# Patient Record
Sex: MALE | Race: ASIAN | Employment: STUDENT | URBAN - METROPOLITAN AREA
[De-identification: names, ages, dates, MRNs, and addresses within clinical notes are randomized per-mention and may not be internally consistent; named-entity substitution may affect disease eponyms.]

---

## 2018-07-24 ENCOUNTER — TRANSFERRED RECORDS (OUTPATIENT)
Dept: HEALTH INFORMATION MANAGEMENT | Facility: CLINIC | Age: 28
End: 2018-07-24

## 2018-09-18 ENCOUNTER — TELEPHONE (OUTPATIENT)
Dept: PSYCHIATRY | Facility: CLINIC | Age: 28
End: 2018-09-18

## 2018-09-18 NOTE — TELEPHONE ENCOUNTER
PSYCHIATRY CLINIC PHONE INTAKE     SERVICES REQUESTED / INTERESTED IN          Med Management    Presenting Problem and Brief History                              What would you like to be seen for? (brief description):  Panic attacks, insomnia, general anxiety. Starting college, nervous about not doing well.   Have you received a mental health diagnosis? Yes   Which one (s): Anxiety  Is there any history of developmental delay?  No   Are you currently seeing a mental health provider?  No            Who / month last seen: Dr. Eladia Morin - [facility]  Do you have mental health records elsewhere?  Yes  Will you sign a release so we can obtain them?  Yes    Have you ever been hospitalized for psychiatric reasons?  No  Describe:      Do you have current thoughts of self-harm?  No    Do you currently have thoughts of harming others?  No       Substance Use History     Do you have any history of alcohol / illicit drug use?  No  Describe:    Have you ever received treatment for this?  No    Describe:       Social History     Does the patient have a guardian?  No      Have you had an ACT team in last 12 months?  No   Do you have any current or past legal issues?  No    OK to leave a detailed voicemail?  Yes    Medical/ Surgical History                                 There is no problem list on file for this patient.         Medications             No current outpatient prescriptions on file.     Citalopram 30mg  Lorazepam 10mg PRN    DISPOSITION      Phone screen completed. Patient will call back with insurance information and then can schedule with NP. -MS

## 2018-10-16 ENCOUNTER — OFFICE VISIT (OUTPATIENT)
Dept: PSYCHIATRY | Facility: CLINIC | Age: 28
End: 2018-10-16
Attending: NURSE PRACTITIONER
Payer: COMMERCIAL

## 2018-10-16 VITALS — SYSTOLIC BLOOD PRESSURE: 114 MMHG | HEART RATE: 73 BPM | DIASTOLIC BLOOD PRESSURE: 75 MMHG | WEIGHT: 181.2 LBS

## 2018-10-16 DIAGNOSIS — F41.9 ANXIETY: Primary | ICD-10-CM

## 2018-10-16 PROCEDURE — G0463 HOSPITAL OUTPT CLINIC VISIT: HCPCS | Mod: ZF

## 2018-10-16 RX ORDER — LORAZEPAM 0.5 MG/1
.5-1 TABLET ORAL PRN
Qty: 30 TABLET | Refills: 0 | Status: SHIPPED | OUTPATIENT
Start: 2018-10-16 | End: 2019-05-28

## 2018-10-16 RX ORDER — CITALOPRAM HYDROBROMIDE 40 MG/1
40 TABLET ORAL EVERY MORNING
Qty: 30 TABLET | Refills: 1 | Status: SHIPPED | OUTPATIENT
Start: 2018-10-16 | End: 2018-12-11

## 2018-10-16 RX ORDER — HYDROXYZINE PAMOATE 25 MG/1
CAPSULE ORAL
Qty: 60 CAPSULE | Refills: 1 | Status: SHIPPED | OUTPATIENT
Start: 2018-10-16 | End: 2019-05-28

## 2018-10-16 ASSESSMENT — PAIN SCALES - GENERAL: PAINLEVEL: NO PAIN (0)

## 2018-10-16 NOTE — MR AVS SNAPSHOT
After Visit Summary   10/16/2018    Rylee Owens    MRN: [id number]           Patient Information     Date Of Birth          1990        Visit Information        Provider Department      10/16/2018 1:00 PM Trevon Molina APRN CNP Psychiatry Clinic        Today's Diagnoses     Anxiety    -  1       Follow-ups after your visit        Your next 10 appointments already scheduled     2018 11:00 AM [facility]   Adult Med Follow UP with JOSE Sexton CNP   Psychiatry Clinic ([facility])    [place] [id number]  [address] [id number] [phone]              Who to contact     Please call your clinic at [phone] to:    Ask questions about your health    Make or cancel appointments    Discuss your medicines    Learn about your test results    Speak to your doctor            Additional Information About Your Visit        MyChart Information     SensorCatht is an electronic gateway that provides easy, online access to your medical records. With BluPanda, you can request a clinic appointment, read your test results, renew a prescription or communicate with your care team.     To sign up for SensorCatht visit the website at [email or web address]   You will be asked to enter the access code listed below, as well as some personal information. Please follow the directions to create your username and password.     Your access code is: [email or web address]  Expires: 2018 10:42 AM     Your access code will  in 90 days. If you need help or a new code, please contact your [facility] or call [phone] for assistance.        Care EveryWhere ID     This is your Care EveryWhere ID. This could be used by other organizations to access your [facility] medical records  [id number]        Your Vitals Were     Pulse                   73            Blood Pressure from Last 3 Encounters:   10/16/18 114/75    Weight from Last 3  Encounters:   10/16/18 82.2 kg (181 lb 3.2 oz)              Today, you had the following     No orders found for display         Today's Medication Changes          These changes are accurate as of 10/16/18 11:59 PM.  If you have any questions, ask your nurse or doctor.               Start taking these medicines.        Dose/Directions    hydrOXYzine 25 MG capsule   Commonly known as:  VISTARIL   Used for:  Anxiety   Started by:  Trevon Molina APRN CNP        Take 1-2 capsules (25-50mg) as needed for sleep   Quantity:  60 capsule   Refills:  1         These medicines have changed or have updated prescriptions.        Dose/Directions    citalopram 40 MG tablet   Commonly known as:  celeXA   This may have changed:    - medication strength  - how much to take  - when to take this   Used for:  Anxiety   Changed by:  Trevon Molina APRN CNP        Dose:  40 mg   Take 1 tablet (40 mg) by mouth every morning   Quantity:  30 tablet   Refills:  1       LORazepam 0.5 MG tablet   Commonly known as:  ATIVAN   This may have changed:  medication strength   Used for:  Anxiety   Changed by:  Trevon Molina APRN CNP        Dose:  0.5-1 mg   Take 1-2 tablets (0.5-1 mg) by mouth as needed for anxiety   Quantity:  30 tablet   Refills:  0            Where to get your medicines      These medications were sent to [place], [place] - [address] [place] [id number]     Phone:  [phone]     citalopram 40 MG tablet    hydrOXYzine 25 MG capsule         Some of these will need a paper prescription and others can be bought over the counter.  Ask your nurse if you have questions.     Bring a paper prescription for each of these medications     LORazepam 0.5 MG tablet                Primary Care Provider    None Specified       No primary provider on file.        Equal Access to Services     ALIRIO AZAR : ozzy Schultz qaybta kaalmada adeegyada,  giovanni richardsondeon lang'aan ah. So [facility] [phone].    ATENCIÓN: Si habla josé, tiene a rapp disposición servicios gratuitos de asistencia lingüística. Kristie ornelas [phone].    We comply with applicable federal civil rights laws and [place] laws. We do not discriminate on the basis of race, color, national origin, age, disability, sex, sexual orientation, or gender identity.            Thank you!     Thank you for choosing PSYCHIATRY CLINIC  for your care. Our goal is always to provide you with excellent care. Hearing back from our patients is one way we can continue to improve our services. Please take a few minutes to complete the written survey that you may receive in the mail after your visit with us. Thank you!             Your Updated Medication List - Protect others around you: Learn how to safely use, store and throw away your medicines at www.disposemymeds.org.          This list is accurate as of 10/16/18 11:59 PM.  Always use your most recent med list.                   Brand Name Dispense Instructions for use Diagnosis    citalopram 40 MG tablet    celeXA    30 tablet    Take 1 tablet (40 mg) by mouth every morning    Anxiety       hydrOXYzine 25 MG capsule    VISTARIL    60 capsule    Take 1-2 capsules (25-50mg) as needed for sleep    Anxiety       LORazepam 0.5 MG tablet    ATIVAN    30 tablet    Take 1-2 tablets (0.5-1 mg) by mouth as needed for anxiety    Anxiety

## 2018-10-16 NOTE — PROGRESS NOTES
"  Psychiatry Clinic Medical Diagnostic Assessment               Rylee Owens is a 28 year old male who presents to the clinic to establish psychiatric care  Therapist: None  PCP: No primary care provider on file.  Other Providers: None  Referred by self-referred for evaluation of anxiety.      History was provided by patient who was a good historian.     Chief Complaint                                                                                                             \" anxiety \"     History of Present Illness                                                                                 4, 4      Psych critical item history includes [no critical items] .       Most recent history:  Rylee endorses symptoms of anxiety.  He is currently taking Citalopram 30mg and Lorazepam 0.5-1.0mg.  He reports the medications have been helpful overall in treatment of anxiety but he still experiences minimal to moderate symptoms.  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  Interactions with other professors and due dates tend to exacerbate his anxiety.  Unable to recall last time experienced panic attack but definitely over a year.  He also reports difficulty falling asleep most nights due to racing thoughts.  He does endorse taking naps when he gets home a few times per week.    No concerns with energy during day.  He is prescribed Lorazepam 0.5mg but only takes once per week.  He does take when struggles to fall asleep.  No concerns with appetite.      Rylee plays computer games and/or watches youtube to help relax.     Rylee does not endorse any concern regarding depression.      Pertinent Background:   Rylee reports he first experienced significant anxiety while in high school.  Most intense anxiety experienced prior to midterms and other exams.  He began treatment while in [place] , which occurred while he was in college.  He was " inpatient for two weeks for treatment of worsening anxiety and panic attacks.  Serozac (Sertaline) was started in hospital.  Anxiety persisted into graduate school.  Celexa started and was significantly helpful.  No history of massimo or psychosis.  Rylee experienced episodes of depression with last occurring last year.  No history of head trauma with loss of consciousness or seizures.  No history of suicidal ideation or SIB.        Recent Symptoms:   Depression:  anhedonia, low energy and insomnia  Elevated:  none  Psychosis:  none  Anxiety:  excessive worry, social anxiety and nervous/overwhelmed  Panic Attack:  none  Trauma Related:  none       Recent Substance Use:  Alcohol- yes, 1 beer per week , Tobacco- no , Caffeine- coffee/ tea [4 cups coffee per week], Opioids- no    Narcan Kit- N/A , Cannabis- no  and Other Illicit Drugs-none     Substance Use History                                                                 None        Psychiatric History     Psychosis- see history        Psychiatric Medication Trials     Zoloft (sertraline) and Celexa (citalopram)                                                       OR this and delete the other    Drug /  Start Date Dose (mg) Helpful Adverse Effects   DC Reason / Date                          Social/ Family History               [per patient report]                                                  1ea, 1ea     FINANCIAL SUPPORT- working     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  Attended [employer] for graduate school with focus on Mathamatics.  Currently post doc at [employer]  SOCIAL/ SPIRITUAL SUPPORT- limited support system       CULTURAL INFLUENCES/ IMPACT- none       TRAUMA HISTORY (self-report)- None  FEELS SAFE AT HOME- Yes  FAMILY HISTORY-  Mother possibly has anxiety issues    Medical / Surgical History                                                                                                                    There is no problem list on file for this patient.      No past surgical history on file.     Medical Review of Systems                                                                                                     2, 10     A comprehensive review of systems was performed and is negative other than noted in the HPI.    Allergy                                Review of patient's allergies indicates not on file.  Current Medications                                                                                                         Current Outpatient Prescriptions   Medication Sig Dispense Refill     CITALOPRAM HYDROBROMIDE PO Take 30 mg by mouth daily       LORAZEPAM PO Take 0.5-1 mg by mouth as needed for anxiety       Vitals                                                                                                                         3, 3     /75  Pulse 73  Wt 82.2 kg (181 lb 3.2 oz)     Mental Status Exam                                                                                      9, 14 cog gs     Alertness: alert  and oriented  Appearance: casually groomed  Behavior/Demeanor: cooperative and pleasant, with good  eye contact   Speech: regular rate and rhythm  Language: no obvious problem  Psychomotor: normal or unremarkable  Mood: anxious  Affect: appropriate; was congruent to mood; was congruent to content  Thought Process/Associations: unremarkable  Thought Content:  Reports none;  Denies suicidal ideation and violent ideation  Perception:  Reports none;  Denies auditory hallucinations and visual hallucinations  Insight: good  Judgment: good  Cognition: (6) does  appear grossly intact; formal cognitive testing was not done  Gait and Station: unremarkable    Labs and Data                                                                                                                     Rating  Scales:   PHQ9 and CAGE AIDE 1. Have you ever felt that you outght to cut down on your drinking or drug use?  no  2. Have people annoyed you by criticizing your drinking or drug use? no  3. Have you ever felt bad or guilty about your drinking or drug use?  no  4. Have you ever had a drink or used drugs first thing in the morning to steady your nerves or to get rid of a hangover?  no    PHQ9 Today:  5  No flowsheet data found.      No lab results found.  No lab results found.    Diagnosis and Assessment                                                                             m2, h3     Today the following issues were addressed:    1) Generalized Anxiety Disorder    [place] Prescription Monitoring Program [] was not checked today:  will be checked next visit.    PSYCHOTROPIC DRUG INTERACTIONS: Micromedex.  Celexa-Hydroxyzine: Concurrent use of CITALOPRAM and HYDROXYZINE may result in increased risk of QT interval prolongation. .    Plan                                                                                                                     m2, h3     1) Medication Management  Increase Citalopram to 40mg daily  Continue Ativan 0.5-1mg as needed.     Currently uses once per week.  Will monitor for increased use  Start Hydroxyzine 25-50mg PRN at bedtime.      2) Therapy  Will address how individualized therapy can help manage anxiety in future appointments      RTC: 1 month    CRISIS NUMBERS:   Provided routinely in AVS.    Treatment Risk Statement:  The patient understands the risks, benefits, adverse effects and alternatives. Agrees to treatment with the capacity to do so. No medical contraindications to treatment. Agrees to call clinic for any problems. The patient understands to call 911 or go to the nearest ED if life threatening or urgent symptoms occur.     WHODAS 2.0  TODAY total score = N/A; [a 12-item WHODAS 2.0 assessment was not completed by the pt today and/or recorded in EPIC].     PROVIDER:   Trevon Molina, JOSE CNP

## 2018-11-12 ASSESSMENT — PATIENT HEALTH QUESTIONNAIRE - PHQ9: SUM OF ALL RESPONSES TO PHQ QUESTIONS 1-9: 5

## 2018-11-13 ENCOUNTER — OFFICE VISIT (OUTPATIENT)
Dept: PSYCHIATRY | Facility: CLINIC | Age: 28
End: 2018-11-13
Attending: NURSE PRACTITIONER
Payer: COMMERCIAL

## 2018-11-13 ENCOUNTER — TELEPHONE (OUTPATIENT)
Dept: PSYCHIATRY | Facility: CLINIC | Age: 28
End: 2018-11-13

## 2018-11-13 VITALS — HEART RATE: 61 BPM | SYSTOLIC BLOOD PRESSURE: 111 MMHG | WEIGHT: 178 LBS | DIASTOLIC BLOOD PRESSURE: 70 MMHG

## 2018-11-13 DIAGNOSIS — F41.9 ANXIETY: Primary | ICD-10-CM

## 2018-11-13 PROCEDURE — G0463 HOSPITAL OUTPT CLINIC VISIT: HCPCS | Mod: ZF

## 2018-11-13 ASSESSMENT — PAIN SCALES - GENERAL: PAINLEVEL: NO PAIN (0)

## 2018-11-13 NOTE — TELEPHONE ENCOUNTER
On 10/16/2018 the patient signed a ENIO to release records from [facility] to [facility]. I faxed the form to [phone] and I sent the ENIO to scanning and kept a copy in psychiatry until scanning is complete/confirmed. Nidia Paul MA

## 2018-11-13 NOTE — PROGRESS NOTES
"  Psychiatry Clinic Progress Note                                                                   Rylee Owens is a 28 year old male who returns to the clinic for follow-up care  Therapist: None  PCP: No primary care provider on file.  Other Providers: None    Pertinent Background:  See previous notes.  Psych critical item history includes [no critical items].     Interim History                                                                                                        4, 4     The patient is a good historian, reports good treatment adherence and was last seen 10/16/18.  Since the last visit, Rylee reports that anxiety has decreased in intensity.  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  He does set aside time to relax and finds that he can relax when necessary.  Hydroxyzine is helping with sleep and he feels rested the next day.  He reports he used Ativan 0.5mg approximately 3 times in the past month.  Does not want to adjust medications today.      Recent Symptoms:   Depression:  anhedonia, low energy and insomnia  Elevated:  none  Psychosis:  none  Anxiety:  daily worry but improved.  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  Attended [employer] for graduate school with focus on Mathamatics.  Currently post doc at [employer]  SOCIAL/ SPIRITUAL SUPPORT- limited support system       CULTURAL INFLUENCES/ IMPACT- none       TRAUMA HISTORY (self-report)- None  FEELS SAFE AT HOME- Yes  FAMILY HISTORY-  Mother possibly has anxiety " "issues    Medical / Surgical History                                                                                                                There is no problem list on file for this patient.      No past surgical history on file.     Medical Review of Systems                                                                                                    2,10   The remainder of the review of systems is noncontributory  Allergy                                Review of patient's allergies indicates not on file.  Current Medications                                                                                                       Current Outpatient Prescriptions   Medication Sig Dispense Refill     citalopram (CELEXA) 40 MG tablet Take 1 tablet (40 mg) by mouth every morning 30 tablet 1     hydrOXYzine (VISTARIL) 25 MG capsule Take 1-2 capsules (25-50mg) as needed for sleep 60 capsule 1     LORazepam (ATIVAN) 0.5 MG tablet Take 1-2 tablets (0.5-1 mg) by mouth as needed for anxiety 30 tablet 0     Vitals                                                                                                                       3, 3   /70  Pulse 61  Wt 80.7 kg (178 lb)   Mental Status Exam                                                                                    9, 14 cog gs     Alertness: alert  and oriented  Appearance: casually groomed  Behavior/Demeanor: cooperative and pleasant, with fair  eye contact   Speech: regular rate and rhythm  Language: no obvious problem  Psychomotor: normal or unremarkable  Mood: \"ok\"  Affect: appropriate; was congruent to mood; was congruent to content  Thought Process/Associations: unremarkable  Thought Content:  Reports none;  Denies suicidal ideation and violent ideation  Perception:  Reports none;  Denies auditory hallucinations and visual hallucinations  Insight: good  Judgment: good  Cognition: (6) does  appear grossly intact; formal cognitive testing was not " done  Gait/Station and/or Muscle Strength/Tone: unremarkable    Labs and Data                                                                                                                 Rating Scales:    PHQ9    PHQ9 Today:  4  PHQ-9 SCORE 10/16/2018   Total Score 5         Diagnosis and Assessment                                                                             m2, h3     Today the following issues were addressed:    1) Generalized Anxiety Disorder     [place] was not checked today:  will be checked next visit.     PSYCHOTROPIC DRUG INTERACTIONS: Micromedex.  Celexa-Hydroxyzine: Concurrent use of CITALOPRAM and HYDROXYZINE may result in increased risk of QT interval prolongation. .    Plan                                                                                                                    m2, h3      1) Medication Management  Continue Citalopram 40mg daily  Continue Ativan 0.5-1mg as needed.                           Currently uses once per week.  Will monitor for increased use  Continue Hydroxyzine 25-50mg PRN at bedtime.       2) Therapy  Will address how individualized therapy can help manage anxiety in future appointments        RTC: 1 month  CRISIS NUMBERS:   Provided routinely in AVS.    Treatment Risk Statement:  The patient understands the risks, benefits, adverse effects and alternatives. Agrees to treatment with the capacity to do so. No medical contraindications to treatment. Agrees to call clinic for any problems. The patient understands to call 911 or go to the nearest ED if life threatening or urgent symptoms occur.        PROVIDER:  JOSE Lundberg CNP

## 2018-11-13 NOTE — MR AVS SNAPSHOT
After Visit Summary   11/13/2018    Rylee Owens    MRN: [id number]           Patient Information     Date Of Birth          1990        Visit Information        Provider Department      11/13/2018 11:00 AM Trevon Molina APRN CNP Psychiatry Clinic        Today's Diagnoses     Anxiety    -  1       Follow-ups after your visit        Your next 10 appointments already scheduled     Dec 11, 2018 10:00 AM [facility]   Adult Med Follow UP with JOSE Sexton CNP   Psychiatry Clinic ([facility])    [place] [id number]  [address] [id number] [phone]              Who to contact     Please call your clinic at [phone] to:    Ask questions about your health    Make or cancel appointments    Discuss your medicines    Learn about your test results    Speak to your doctor            Additional Information About Your Visit        MyChart Information     [employer]oot gives you secure access to your electronic health record. If you see a primary care provider, you can also send messages to your care team and make appointments. If you have questions, please call your primary care clinic.  If you do not have a primary care provider, please call [phone] and they will assist you.      Pulsity is an electronic gateway that provides easy, online access to your medical records. With Pulsity, you can request a clinic appointment, read your test results, renew a prescription or communicate with your care team.     To access your existing account, please contact your [facility] or call [phone] for assistance.        Care EveryWhere ID     This is your Care EveryWhere ID. This could be used by other organizations to access your [place] medical records  [id number]        Your Vitals Were     Pulse                   61            Blood Pressure from Last 3 Encounters:   11/13/18 111/70   10/16/18 114/75    Weight from Last  3 Encounters:   11/13/18 80.7 kg (178 lb)   10/16/18 82.2 kg (181 lb 3.2 oz)              Today, you had the following     No orders found for display       Primary Care Provider    None Specified       No primary provider on file.        Equal Access to Services     ALIRIO AZAR : Hadii aad ku hadjordin Sohesham, wadomda luqadaha, qaybta kaalmada adeabner, giovanni maite ulisesgladis dietzalexandrarichard cifuentes. So [facility] [phone].    ATENCIÓN: Si habla español, tiene a rapp disposición servicios gratuitos de asistencia lingüística. Llame al [phone].    We comply with applicable federal civil rights laws and [place] laws. We do not discriminate on the basis of race, color, national origin, age, disability, sex, sexual orientation, or gender identity.            Thank you!     Thank you for choosing PSYCHIATRY CLINIC  for your care. Our goal is always to provide you with excellent care. Hearing back from our patients is one way we can continue to improve our services. Please take a few minutes to complete the written survey that you may receive in the mail after your visit with us. Thank you!             Your Updated Medication List - Protect others around you: Learn how to safely use, store and throw away your medicines at www.disposemymeds.org.          This list is accurate as of 11/13/18 11:59 PM.  Always use your most recent med list.                   Brand Name Dispense Instructions for use Diagnosis    citalopram 40 MG tablet    celeXA    30 tablet    Take 1 tablet (40 mg) by mouth every morning    Anxiety       hydrOXYzine 25 MG capsule    VISTARIL    60 capsule    Take 1-2 capsules (25-50mg) as needed for sleep    Anxiety       LORazepam 0.5 MG tablet    ATIVAN    30 tablet    Take 1-2 tablets (0.5-1 mg) by mouth as needed for anxiety    Anxiety

## 2018-12-11 ENCOUNTER — OFFICE VISIT (OUTPATIENT)
Dept: PSYCHIATRY | Facility: CLINIC | Age: 28
End: 2018-12-11
Attending: NURSE PRACTITIONER
Payer: COMMERCIAL

## 2018-12-11 VITALS — WEIGHT: 180 LBS | DIASTOLIC BLOOD PRESSURE: 73 MMHG | HEART RATE: 56 BPM | SYSTOLIC BLOOD PRESSURE: 111 MMHG

## 2018-12-11 DIAGNOSIS — F41.9 ANXIETY: ICD-10-CM

## 2018-12-11 PROCEDURE — G0463 HOSPITAL OUTPT CLINIC VISIT: HCPCS | Mod: ZF

## 2018-12-11 RX ORDER — CITALOPRAM HYDROBROMIDE 40 MG/1
40 TABLET ORAL EVERY MORNING
Qty: 90 TABLET | Refills: 0 | Status: SHIPPED | OUTPATIENT
Start: 2018-12-11 | End: 2019-03-12

## 2018-12-11 ASSESSMENT — PAIN SCALES - GENERAL: PAINLEVEL: NO PAIN (0)

## 2018-12-11 NOTE — PATIENT INSTRUCTIONS
Thank you for coming to the PSYCHIATRY CLINIC.    Lab Testing:  If you had lab testing today and your results are reassuring or normal they will be mailed to you or sent through [employer] within 7 days.   If the lab tests need quick action we will call you with the results.  The phone number we will call with results is # [phone] (home) . If this is not the best number please call our clinic and change the number.    Medication Refills:  If you need any refills please call your pharmacy and they will contact us. Our fax number for refills is [phone]. Please allow three business for refill processing.   If you need to  your refill at a new pharmacy, please contact the new pharmacy directly. The new pharmacy will help you get your medications transferred.     Scheduling:  If you have any concerns about today's visit or wish to schedule another appointment please call our office during normal business hours [phone] (8-5:00 M-F)    Contact Us:  Please call [phone] during business hours (8-5:00 M-F).  If after clinic hours, or on the weekend, please call  [phone].    Financial Assistance [phone]  [employer] Billing [phone]  [employer] Billing [phone]  Medical Records [phone]      MENTAL HEALTH CRISIS NUMBERS:  [facility]:   [facility] - [phone]   Crisis Residence [facility] - [phone]   Walk-In Counseling [facility] [phone]   COPE 24/7 [place] Mobile Team for Adults - [[phone]]; Child - [[phone]]     Crisis Connection - [phone]     [facility]:   [facility] - [phone]   Walk-in counseling [facility] - [phone]   Walk-in counseling  - [phone]   Crisis Residence [facility] - [phone]   Urgent Care Adult Mental Health:   --Drop-in, 24/7 crisis line, and [employer] Mobile Team [[phone]]    CRISIS TEXT  LINE: Text [phone] from anywhere, anytime, any crisis 24/7;    OR SEE www.crisistextline.org     Poison Control Center - [phone]    CHILD: [facility] needs assessment team - [phone]     [facility] - [phone]; or [employer] [facility] - [phone]    If you have a medical emergency please call 911or go to the nearest ER.                    _____________________________________________    Again thank you for choosing PSYCHIATRY CLINIC and please let us know how we can best partner with you to improve you and your family's health.  You may be receiving a survey in the mail regarding this appointment. We would love to have your feedback, both positive and negative, so please fill out the survey and return it using the provided envelope. The survey is done by an external company, so your answers are anonymous.

## 2018-12-11 NOTE — PROGRESS NOTES
"  Psychiatry Clinic Progress Note                                                                   Rylee Owens is a 28 year old male who returns to the clinic for follow-up care  Therapist: None  PCP: No primary care provider on file.  Other Providers: None    Pertinent Background:  See previous notes.  Psych critical item history includes [no critical items].     Interim History                                                                                                        4, 4     The patient is a good historian, reports good treatment adherence and was last seen 11/13/18.  Since the last visit, Rylee reports everything is \"ok.\"  He reports he had a paper rejected for inclusion in a journal.  The rejection worsened depression but he believes it will pass. He appears to be coping with rejection appropriately.  He is looking forward to end of semester as he plans to travel ([place] and [employer]  Rylee reports his anxiety is better since last appointment.  No concerns with sleep.  Occasionally is taking hydroxyzine for sleep.  Continues to use Ativan less than once per week.      11/13/18: Rylee reports that anxiety has decreased in intensity.  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  He does set aside time to relax and finds that he can relax when necessary.  Hydroxyzine is helping with sleep and he feels rested the next day.  He reports he used Ativan 0.5mg approximately 3 times in the past month.  Does not want to adjust medications today.      Recent Symptoms:   Depression:  depressed mood, anhedonia, low energy and insomnia  Elevated:  none  Psychosis:  none  Anxiety:  daily worry but improved.  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  Attended [employer] for graduate school with focus on Mathamatics.  Currently post doc at [employer]  SOCIAL/ SPIRITUAL SUPPORT- limited support system       CULTURAL INFLUENCES/ IMPACT- none       TRAUMA HISTORY (self-report)- None  FEELS SAFE AT HOME- Yes  FAMILY HISTORY-  Mother possibly has anxiety issues    Medical / Surgical History                                                                                                                There is no problem list on file for this patient.      No past surgical history on file.     Medical Review of Systems                                                                                                    2,10   The remainder of the review of systems is noncontributory  Allergy                                Patient has no allergy information on record.  Current Medications                                                                                                       Current Outpatient Medications   Medication Sig Dispense Refill     citalopram (CELEXA) 40 MG tablet Take 1 tablet (40 mg) by mouth every morning 30 tablet 1     hydrOXYzine (VISTARIL) 25 MG capsule Take 1-2 capsules (25-50mg) as needed for sleep 60 capsule 1     LORazepam (ATIVAN) 0.5 MG tablet Take 1-2 tablets (0.5-1 mg) by mouth as needed for anxiety 30 tablet 0     Vitals                                                                                                                       3, 3   /73   Pulse 56   Wt 81.6 kg (180 lb)    Mental Status Exam                                                                                    9, 14 cog gs     Alertness: alert  and oriented  Appearance: casually groomed  Behavior/Demeanor: cooperative and pleasant, with fair  eye  "contact   Speech: normal  Language: intact  Psychomotor: normal or unremarkable  Mood: \"ok\"  Affect: appropriate; was congruent to mood; was congruent to content  Thought Process/Associations: unremarkable  Thought Content:  Reports none;  Denies suicidal ideation and violent ideation  Perception:  Reports none;  Denies auditory hallucinations and visual hallucinations  Insight: good  Judgment: good  Cognition: (6) does  appear grossly intact; formal cognitive testing was not done  Gait/Station and/or Muscle Strength/Tone: unremarkable    Labs and Data                                                                                                                 Rating Scales:    PHQ9    PHQ9 Today:  4  PHQ-9 SCORE 10/16/2018   PHQ-9 Total Score 5         Diagnosis and Assessment                                                                             m2, h3     Today the following issues were addressed:    1) Generalized Anxiety Disorder     [place] Prescription Monitoring [place] [] was not checked today:  will be checked next visit.     PSYCHOTROPIC DRUG INTERACTIONS: Micromedex.  Celexa-Hydroxyzine: Concurrent use of CITALOPRAM and HYDROXYZINE may result in increased risk of QT interval prolongation. .    Plan                                                                                                                    m2, h3      1) Medication Management  Continue Citalopram 40mg daily  Continue Ativan 0.5-1mg as needed.                           Currently uses once per week.  Will monitor for increased use  Continue Hydroxyzine 25-50mg PRN at bedtime.       2) Therapy  Will address how individualized therapy can help manage anxiety in future appointments        RTC: 3 months  CRISIS NUMBERS:   Provided routinely in AVS.    Treatment Risk Statement:  The patient understands the risks, benefits, adverse effects and alternatives. Agrees to treatment with the capacity to do so. No medical contraindications to " treatment. Agrees to call clinic for any problems. The patient understands to call 911 or go to the nearest ED if life threatening or urgent symptoms occur.        PROVIDER:  JOSE Lundberg CNP

## 2018-12-12 ASSESSMENT — PATIENT HEALTH QUESTIONNAIRE - PHQ9: SUM OF ALL RESPONSES TO PHQ QUESTIONS 1-9: 4

## 2018-12-28 ENCOUNTER — TELEPHONE (OUTPATIENT)
Dept: PSYCHIATRY | Facility: CLINIC | Age: 28
End: 2018-12-28

## 2018-12-28 NOTE — TELEPHONE ENCOUNTER
On 12/28/2018, 50 pages of records were received from [facility]. This writer put the records in Trevon Molina's folder upfront and this was routed to Trevon.  I sent the documents to scanning on 12/28/2018 and held a copy in Psychiatry until scanning is confirmed. Valerie Benítez, CMA

## 2019-01-07 ASSESSMENT — PATIENT HEALTH QUESTIONNAIRE - PHQ9: SUM OF ALL RESPONSES TO PHQ QUESTIONS 1-9: 4

## 2019-03-12 ENCOUNTER — OFFICE VISIT (OUTPATIENT)
Dept: PSYCHIATRY | Facility: CLINIC | Age: 29
End: 2019-03-12
Attending: NURSE PRACTITIONER
Payer: COMMERCIAL

## 2019-03-12 VITALS — SYSTOLIC BLOOD PRESSURE: 114 MMHG | DIASTOLIC BLOOD PRESSURE: 74 MMHG | WEIGHT: 184.6 LBS | HEART RATE: 65 BPM

## 2019-03-12 DIAGNOSIS — F41.9 ANXIETY: ICD-10-CM

## 2019-03-12 PROCEDURE — G0463 HOSPITAL OUTPT CLINIC VISIT: HCPCS | Mod: ZF

## 2019-03-12 RX ORDER — CITALOPRAM HYDROBROMIDE 40 MG/1
40 TABLET ORAL EVERY MORNING
Qty: 90 TABLET | Refills: 0 | Status: SHIPPED | OUTPATIENT
Start: 2019-03-12 | End: 2019-05-28

## 2019-03-12 ASSESSMENT — PAIN SCALES - GENERAL: PAINLEVEL: NO PAIN (0)

## 2019-03-12 NOTE — PROGRESS NOTES
"  Psychiatry Clinic Progress Note                                                                   Rylee Owens is a 28 year old male who returns to the clinic for follow-up care  Therapist: None  PCP: No primary care provider on file.  Other Providers: None    Pertinent Background:  See previous notes.  Psych critical item history includes [no critical items].     Interim History                                                                                                        4, 4     The patient is a good historian, reports good treatment adherence and was last seen 12/11/18.  Since the last visit, Rylee reports that past 3 months were \"good. I feel better.\" He experiences less worry and feels less tense in his body.   He used Lorazepam once or twice in past 3 months.  Using hydroxyzine instead when feeling tense or excessive worry.  Rylee has been using video game as a distraction from school-related stress.  He is playing for an hour when he gets home when anxiety is worse.  He reports staying up late most nights (3am).  He does have class at 10am (wakes at 8am) three days per week.  He is able to function but on the days he does not have class, he will occasionally sleep until the afternoon.  He plans to go to [place] next week during spring break.  No concerns with side effects.      12/11/18: Rylee reports everything is \"ok.\"  He reports he had a paper rejected for inclusion in a journal.  The rejection worsened depression but he believes it will pass. He appears to be coping with rejection appropriately.  He is looking forward to end of semester as he plans to travel ([place] and [facility]).  Rylee reports his anxiety is better since last appointment.  No concerns with sleep.  Occasionally is taking hydroxyzine for sleep.  Continues to use Ativan less than once per week.      11/13/18: Rylee reports that anxiety has decreased in intensity.  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  He does set aside time to relax and finds that he can relax when necessary.  Hydroxyzine is helping with sleep and he feels rested the next day.  He reports he used Ativan 0.5mg approximately 3 times in the past month.  Does not want to adjust medications today.      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  Attended [employer] for graduate school with focus on Mathamatics.  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      No past surgical history on file.     Medical Review of Systems                                                                                                    2,10   The remainder of the review of systems is noncontributory  Allergy                                Patient has no allergy information on record.  Current Medications                                                                                                       Current Outpatient Medications   Medication Sig Dispense Refill     citalopram (CELEXA) 40 MG tablet Take 1 tablet " "(40 mg) by mouth every morning 90 tablet 0     hydrOXYzine (VISTARIL) 25 MG capsule Take 1-2 capsules (25-50mg) as needed for sleep 60 capsule 1     LORazepam (ATIVAN) 0.5 MG tablet Take 1-2 tablets (0.5-1 mg) by mouth as needed for anxiety 30 tablet 0     Vitals                                                                                                                       3, 3   /74   Pulse 65   Wt 83.7 kg (184 lb 9.6 oz)    Mental Status Exam                                                                                    9, 14 cog gs     Alertness: alert  and oriented  Appearance: casually groomed  Behavior/Demeanor: cooperative and pleasant, with fair  eye contact   Speech: normal  Language: intact  Psychomotor: normal or unremarkable  Mood: \"ok\"  Affect: appropriate; was congruent to mood; was congruent to content  Thought Process/Associations: unremarkable  Thought Content:  Reports none;  Denies suicidal ideation and violent ideation  Perception:  Reports none;  Denies auditory hallucinations and visual hallucinations  Insight: good  Judgment: good  Cognition: (6) does  appear grossly intact; formal cognitive testing was not done  Gait/Station and/or Muscle Strength/Tone: unremarkable    Labs and Data                                                                                                                 Rating Scales:    PHQ9    PHQ9 Today:  2  PHQ-9 SCORE 10/16/2018 11/13/2018 12/11/2018   PHQ-9 Total Score 5 4 4         Diagnosis and Assessment                                                                             m2, h3     Today the following issues were addressed:    1) Generalized Anxiety Disorder     [place] [] was not checked today:  will be checked next visit.     PSYCHOTROPIC DRUG INTERACTIONS: Micromedex.  Celexa-Hydroxyzine: Concurrent use of CITALOPRAM and HYDROXYZINE may result in increased risk of QT interval prolongation. .    Plan     "                                                                                                                m2, h3      1) Medication Management  Continue Citalopram 40mg daily  Continue Ativan 0.5-1mg as needed.                           Using sparingly.  1-2 times in past 3 months  Continue Hydroxyzine 25-50mg PRN at bedtime.       2) Therapy  recommended        RTC: 3 months    CRISIS NUMBERS:   Provided routinely in AVS.    Treatment Risk Statement:  The patient understands the risks, benefits, adverse effects and alternatives. Agrees to treatment with the capacity to do so. No medical contraindications to treatment. Agrees to call clinic for any problems. The patient understands to call 911 or go to the nearest ED if life threatening or urgent symptoms occur.        PROVIDER:  JOSE Lundberg CNP

## 2019-03-12 NOTE — PATIENT INSTRUCTIONS
Thank you for coming to the PSYCHIATRY CLINIC.    Lab Testing:  If you had lab testing today and your results are reassuring or normal they will be mailed to you or sent through [employer] within 7 days.   If the lab tests need quick action we will call you with the results.  The phone number we will call with results is # [phone] (home) . If this is not the best number please call our clinic and change the number.    Medication Refills:  If you need any refills please call your pharmacy and they will contact us. Our fax number for refills is [phone]. Please allow three business for refill processing.   If you need to  your refill at a new pharmacy, please contact the new pharmacy directly. The new pharmacy will help you get your medications transferred.     Scheduling:  If you have any concerns about today's visit or wish to schedule another appointment please call our office during normal business hours [phone] (8-5:00 M-F)    Contact Us:  Please call [phone] during business hours (8-5:00 M-F).  If after clinic hours, or on the weekend, please call  [phone].    Financial Assistance [phone]  [employer] Billing [phone]  [employer] Billing [phone]  Medical Records [phone]      MENTAL HEALTH CRISIS NUMBERS:  [facility]:   [facility] - [phone]   Crisis Residence [facility] - [phone]   Walk-In Counseling [facility] [phone]   COPE 24/7 [place] Mobile Team for Adults - [[phone]]; Child - [[phone]]     Crisis Connection - [phone]     [facility]:   [facility] - [phone]   Walk-in counseling [facility] - [phone]   Walk-in counseling [facility] - [phone]   Crisis Residence [facility] - [phone]   Urgent Care Adult Mental Health:   --Drop-in, 24/7 crisis line, and [employer] Mobile Team [[phone]]    CRISIS TEXT  LINE: Text [phone] from anywhere, anytime, any crisis 24/7;    OR SEE www.crisistextline.org     Poison Control Center - [phone]    CHILD: [facility] needs assessment team - [phone]     [facility] - [phone]; or [employer] [facility] - [phone]    If you have a medical emergency please call 911or go to the nearest ER.                    _____________________________________________    Again thank you for choosing PSYCHIATRY CLINIC and please let us know how we can best partner with you to improve you and your family's health.  You may be receiving a survey in the mail regarding this appointment. We would love to have your feedback, both positive and negative, so please fill out the survey and return it using the provided envelope. The survey is done by an external company, so your answers are anonymous.

## 2019-04-05 ASSESSMENT — PATIENT HEALTH QUESTIONNAIRE - PHQ9: SUM OF ALL RESPONSES TO PHQ QUESTIONS 1-9: 2

## 2019-05-28 ENCOUNTER — OFFICE VISIT (OUTPATIENT)
Dept: PSYCHIATRY | Facility: CLINIC | Age: 29
End: 2019-05-28
Attending: NURSE PRACTITIONER
Payer: COMMERCIAL

## 2019-05-28 VITALS — WEIGHT: 192 LBS | HEART RATE: 71 BPM | SYSTOLIC BLOOD PRESSURE: 118 MMHG | DIASTOLIC BLOOD PRESSURE: 78 MMHG

## 2019-05-28 DIAGNOSIS — F41.9 ANXIETY: ICD-10-CM

## 2019-05-28 PROCEDURE — G0463 HOSPITAL OUTPT CLINIC VISIT: HCPCS | Mod: ZF

## 2019-05-28 RX ORDER — CITALOPRAM HYDROBROMIDE 40 MG/1
40 TABLET ORAL EVERY MORNING
Qty: 90 TABLET | Refills: 0 | Status: SHIPPED | OUTPATIENT
Start: 2019-05-28 | End: 2019-07-30

## 2019-05-28 RX ORDER — LORAZEPAM 0.5 MG/1
.5-1 TABLET ORAL PRN
Qty: 30 TABLET | Refills: 0 | Status: SHIPPED | OUTPATIENT
Start: 2019-05-28

## 2019-05-28 RX ORDER — HYDROXYZINE PAMOATE 25 MG/1
CAPSULE ORAL
Qty: 60 CAPSULE | Refills: 1 | Status: SHIPPED | OUTPATIENT
Start: 2019-05-28 | End: 2020-09-29

## 2019-05-28 ASSESSMENT — PAIN SCALES - GENERAL: PAINLEVEL: NO PAIN (0)

## 2019-05-28 NOTE — PROGRESS NOTES
"  Psychiatry Clinic Progress Note                                                                   Rylee Owens is a 28 year old male who returns to the clinic for follow-up care  Therapist: None  PCP: No primary care provider on file.  Other Providers: None    Pertinent Background:  See previous notes.  Psych critical item history includes [no critical items].     Interim History                                                                                                        4, 4     The patient is a good historian, reports good treatment adherence and was last seen 3/12/19.  Since the last visit, Rylee reports he ended engagement with [employer] which has negatively impacted mood.  He has been experiencing increased insomnia and finds himself overeating.  Semester ended last week which has decreased anxiety and stress.  Reports taking Ativan and hydroxyzine for insomnia and found ineffective. He did try Benadryl (unknown dose) as he has seasonal allergies and was helpful with sleep.      3/12/19: Rylee reports that past 3 months were \"good. I feel better.\" He experiences less worry and feels less tense in his body.   He used Lorazepam once or twice in past 3 months.  Using hydroxyzine instead when feeling tense or excessive worry.  Rylee has been using video game as a distraction from school-related stress.  He is playing for an hour when he gets home when anxiety is worse.  He reports staying up late most nights (3am).  He does have class at 10am (wakes at 8am) three days per week.  He is able to function but on the days he does not have class, he will occasionally sleep until the afternoon.  He plans to go to [place] next week during spring break.  No concerns with side effects.      12/11/18: Rylee reports everything is \"ok.\"  He reports he had a paper rejected for inclusion in a journal.  The rejection worsened depression but he believes it will pass. He appears to be coping with rejection " "appropriately.  He is looking forward to end of semester as he plans to travel ([place] and [facility]).  Rylee reports his anxiety is better since last appointment.  No concerns with sleep.  Occasionally is taking hydroxyzine for sleep.  Continues to use Ativan less than once per week.      11/13/18: Rylee reports that anxiety has decreased in intensity.  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  He does set aside time to relax and finds that he can relax when necessary.  Hydroxyzine is helping with sleep and he feels rested the next day.  He reports he used Ativan 0.5mg approximately 3 times in the past month.  Does not want to adjust medications today.      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  Attended [employer] for graduate school with focus on Mathamatics.  Currently post doc at [employer]  SOCIAL/ SPIRITUAL SUPPORT- limited support system       CULTURAL INFLUENCES/ IMPACT- none       TRAUMA HISTORY (self-report)- None  FEELS SAFE AT HOME- Yes  FAMILY HISTORY-  Mother possibly has anxiety issues    Medical / Surgical History                                                                                                                There is no problem list on file for this patient.      No past surgical history on file.     Medical Review of Systems                                        " "                                                            2,10   The remainder of the review of systems is noncontributory  Allergy                                Patient has no known allergies.  Current Medications                                                                                                       Current Outpatient Medications   Medication Sig Dispense Refill     citalopram (CELEXA) 40 MG tablet Take 1 tablet (40 mg) by mouth every morning 90 tablet 0     hydrOXYzine (VISTARIL) 25 MG capsule Take 1-2 capsules (25-50mg) as needed for sleep 60 capsule 1     LORazepam (ATIVAN) 0.5 MG tablet Take 1-2 tablets (0.5-1 mg) by mouth as needed for anxiety 30 tablet 0     Vitals                                                                                                                       3, 3   /78   Pulse 71   Wt 87.1 kg (192 lb)    Mental Status Exam                                                                                    9, 14 cog gs     Alertness: alert  and oriented  Appearance: casually groomed  Behavior/Demeanor: cooperative and pleasant, with fair  eye contact   Speech: regular rate and rhythm  Language: no problems  Psychomotor: normal or unremarkable  Mood: \"ok\"  Affect: appropriate; was congruent to mood; was congruent to content  Thought Process/Associations: unremarkable  Thought Content:  Reports none;  Denies suicidal ideation and violent ideation  Perception:  Reports none;  Denies auditory hallucinations and visual hallucinations  Insight: good  Judgment: good  Cognition: (6) does  appear grossly intact; formal cognitive testing was not done  Gait/Station and/or Muscle Strength/Tone: unremarkable    Labs and Data                                                                                                                 Rating Scales:    PHQ9    PHQ9 Today:  8  PHQ-9 SCORE 11/13/2018 12/11/2018 3/12/2019   PHQ-9 Total Score 4 4 2         Diagnosis and " Assessment                                                                             m2, h3     Today the following issues were addressed:    1) Generalized Anxiety Disorder     [place] Prescription Monitoring [place] [] was not checked today:  will be checked next visit.     PSYCHOTROPIC DRUG INTERACTIONS: Micromedex.  Celexa-Hydroxyzine: Concurrent use of CITALOPRAM and HYDROXYZINE may result in increased risk of QT interval prolongation. .    Plan                                                                                                                    m2, h3      1) Medication Management  Continue Citalopram 40mg daily  Continue Ativan 0.5-1mg as needed.                           Using sparingly.  1-2 times in past 3 months  Continue Hydroxyzine 25-50mg PRN at bedtime.       2) Therapy  recommended        RTC: 3 months    CRISIS NUMBERS:   Provided routinely in AVS.    Treatment Risk Statement:  The patient understands the risks, benefits, adverse effects and alternatives. Agrees to treatment with the capacity to do so. No medical contraindications to treatment. Agrees to call clinic for any problems. The patient understands to call 911 or go to the nearest ED if life threatening or urgent symptoms occur.        PROVIDER:  JOSE Lundberg CNP

## 2019-05-31 ENCOUNTER — TELEPHONE (OUTPATIENT)
Dept: PSYCHIATRY | Facility: CLINIC | Age: 29
End: 2019-05-31

## 2019-05-31 NOTE — TELEPHONE ENCOUNTER
Received signed script from the provider. Faxed to Chuck at [phone]. Script held at writer's desk.

## 2019-05-31 NOTE — TELEPHONE ENCOUNTER
Writer located unsigned Ativan 0.5 mg script, #30 with 0 refills.     Writer attempted to call [facility], which was closed. Script placed in provider's folder for signature. Will then fax to [facility].

## 2019-06-13 ASSESSMENT — PATIENT HEALTH QUESTIONNAIRE - PHQ9: SUM OF ALL RESPONSES TO PHQ QUESTIONS 1-9: 8

## 2019-07-30 ENCOUNTER — OFFICE VISIT (OUTPATIENT)
Dept: PSYCHIATRY | Facility: CLINIC | Age: 29
End: 2019-07-30
Attending: NURSE PRACTITIONER
Payer: COMMERCIAL

## 2019-07-30 VITALS — WEIGHT: 187 LBS | SYSTOLIC BLOOD PRESSURE: 121 MMHG | DIASTOLIC BLOOD PRESSURE: 81 MMHG | HEART RATE: 62 BPM

## 2019-07-30 DIAGNOSIS — F41.9 ANXIETY: ICD-10-CM

## 2019-07-30 PROCEDURE — G0463 HOSPITAL OUTPT CLINIC VISIT: HCPCS | Mod: ZF

## 2019-07-30 RX ORDER — LORAZEPAM 0.5 MG/1
.5-1 TABLET ORAL PRN
Qty: 30 TABLET | Refills: 0 | Status: CANCELLED | OUTPATIENT
Start: 2019-07-30

## 2019-07-30 RX ORDER — CITALOPRAM HYDROBROMIDE 40 MG/1
40 TABLET ORAL EVERY MORNING
Qty: 90 TABLET | Refills: 0 | Status: SHIPPED | OUTPATIENT
Start: 2019-07-30 | End: 2019-10-29

## 2019-07-30 RX ORDER — HYDROXYZINE PAMOATE 25 MG/1
CAPSULE ORAL
Qty: 60 CAPSULE | Refills: 1 | Status: CANCELLED | OUTPATIENT
Start: 2019-07-30

## 2019-07-30 ASSESSMENT — PAIN SCALES - GENERAL: PAINLEVEL: NO PAIN (0)

## 2019-07-30 NOTE — PROGRESS NOTES
"  Psychiatry Clinic Progress Note                                                                   Rylee Owens is a 29 year old male who returns to the clinic for follow-up care  Therapist: None  PCP: No primary care provider on file.  Other Providers: None    Pertinent Background:  See previous notes.  Psych critical item history includes [no critical items].     Interim History                                                                                                        4, 4     The patient is a good historian, reports good treatment adherence and was last seen 5/28/19.  Since the last visit, Rylee report he is \"good.\"  Reports improvement since last appointment.  He no longer is overeating and mood has improved.  He has been taking OTC benadryl twice per week for sleep.  He finds the benadryl puts him to sleep faster and he feels less sedated the following morning.  Also continues to take hydroxyzine approximately once per week.  He does not take medications simultaneously.  Rylee is currently on summer break from school.  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  Rylee is also trying to meet new people and develop new hobbies.  He believes school schedule will be very helpful.  He is leaving for 3 week trip to [place] next week.      5/28/19: Rylee reports he ended engagement with [employer] which has negatively impacted mood.  He has been experiencing increased insomnia and finds himself overeating.  Semester ended last week which has decreased anxiety and stress.  Reports taking Ativan and hydroxyzine for insomnia and found ineffective. He did try Benadryl (unknown dose) as he has seasonal allergies and was helpful with sleep.    3/12/19: Rylee reports that past 3 months were \"good. I feel better.\" He experiences less worry and feels less tense in his body.   He used Lorazepam once or twice in past 3 months.  Using hydroxyzine " "instead when feeling tense or excessive worry.  Rylee has been using video game as a distraction from school-related stress.  He is playing for an hour when he gets home when anxiety is worse.  He reports staying up late most nights (3am).  He does have class at 10am (wakes at 8am) three days per week.  He is able to function but on the days he does not have class, he will occasionally sleep until the afternoon.  He plans to go to [place] next week during spring break.  No concerns with side effects.      12/11/18: Rylee reports everything is \"ok.\"  He reports he had a paper rejected for inclusion in a journal.  The rejection worsened depression but he believes it will pass. He appears to be coping with rejection appropriately.  He is looking forward to end of semester as he plans to travel ([place] and [facility]).  Rylee reports his anxiety is better since last appointment.  No concerns with sleep.  Occasionally is taking hydroxyzine for sleep.  Continues to use Ativan less than once per week.      11/13/18: Rylee reports that anxiety has decreased in intensity.  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  He does set aside time to relax and finds that he can relax when necessary.  Hydroxyzine is helping with sleep and he feels rested the next day.  He reports he used Ativan 0.5mg approximately 3 times in the past month.  Does not want to adjust medications today.      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  CHILDREN- None       LIVING " "SITUATION- Lives alone in apartment in [place]      LEGAL- None  EARLY HISTORY/ EDUCATION- Grew up in [place].  Attended [employer] for graduate school with focus on Mathamatics.  Currently post doc at [employer]  SOCIAL/ SPIRITUAL SUPPORT- limited support system       CULTURAL INFLUENCES/ IMPACT- none       TRAUMA HISTORY (self-report)- None  FEELS SAFE AT HOME- Yes  FAMILY HISTORY-  Mother possibly has anxiety issues    Medical / Surgical History                                                                                                                There is no problem list on file for this patient.      No past surgical history on file.     Medical Review of Systems                                                                                                    2,10   The remainder of the review of systems is noncontributory  Allergy                                Patient has no known allergies.  Current Medications                                                                                                       Current Outpatient Medications   Medication Sig Dispense Refill     citalopram (CELEXA) 40 MG tablet Take 1 tablet (40 mg) by mouth every morning 90 tablet 0     hydrOXYzine (VISTARIL) 25 MG capsule Take 1-2 capsules (25-50mg) as needed for sleep 60 capsule 1     LORazepam (ATIVAN) 0.5 MG tablet Take 1-2 tablets (0.5-1 mg) by mouth as needed for anxiety 30 tablet 0     Vitals                                                                                                                       3, 3   /81   Pulse 62   Wt 84.8 kg (187 lb)    Mental Status Exam                                                                                    9, 14 cog gs     Alertness: alert  and oriented  Appearance: casually groomed  Behavior/Demeanor: cooperative, pleasant and calm, with fair  eye contact   Speech: normal  Language: no problems  Psychomotor: normal or unremarkable  Mood: \"good\"  Affect: " appropriate; was congruent to mood; was congruent to content  Thought Process/Associations: unremarkable  Thought Content:  Reports none;  Denies suicidal ideation and violent ideation  Perception:  Reports none;  Denies auditory hallucinations and visual hallucinations  Insight: good  Judgment: good  Cognition: (6) does  appear grossly intact; formal cognitive testing was not done  Gait/Station and/or Muscle Strength/Tone: unremarkable    Labs and Data                                                                                                                 Rating Scales:    PHQ9    PHQ9 Today:  4  PHQ-9 SCORE 12/11/2018 3/12/2019 6/13/2019   PHQ-9 Total Score 4 2 8         Diagnosis and Assessment                                                                             m2, h3     Today the following issues were addressed:    1) Generalized Anxiety Disorder     [place] was not [place] today:  will be checked next visit.     PSYCHOTROPIC DRUG INTERACTIONS: Micromedex.  Celexa-Hydroxyzine: Concurrent use of CITALOPRAM and HYDROXYZINE may result in increased risk of QT interval prolongation. .    Plan                                                                                                                    m2, h3      1) Medication Management  Continue Citalopram 40mg daily  Continue Ativan 0.5-1mg as needed.                           Using sparingly.  1-2 times in past 3 months  Continue Hydroxyzine 25-50mg PRN at bedtime.       2) Therapy  recommended        RTC: 3 months    CRISIS NUMBERS:   Provided routinely in AVS.    Treatment Risk Statement:  The patient understands the risks, benefits, adverse effects and alternatives. Agrees to treatment with the capacity to do so. No medical contraindications to treatment. Agrees to call clinic for any problems. The patient understands to call 911 or go to the nearest ED if life threatening or urgent symptoms occur.         PROVIDER:  JOSE Lundberg CNP

## 2019-07-31 ASSESSMENT — PATIENT HEALTH QUESTIONNAIRE - PHQ9: SUM OF ALL RESPONSES TO PHQ QUESTIONS 1-9: 4

## 2019-10-29 ENCOUNTER — OFFICE VISIT (OUTPATIENT)
Dept: PSYCHIATRY | Facility: CLINIC | Age: 29
End: 2019-10-29
Attending: NURSE PRACTITIONER
Payer: COMMERCIAL

## 2019-10-29 VITALS — WEIGHT: 171.2 LBS | HEART RATE: 67 BPM | SYSTOLIC BLOOD PRESSURE: 113 MMHG | DIASTOLIC BLOOD PRESSURE: 74 MMHG

## 2019-10-29 DIAGNOSIS — F41.9 ANXIETY: ICD-10-CM

## 2019-10-29 PROCEDURE — G0463 HOSPITAL OUTPT CLINIC VISIT: HCPCS | Mod: ZF

## 2019-10-29 RX ORDER — CITALOPRAM HYDROBROMIDE 40 MG/1
40 TABLET ORAL EVERY MORNING
Qty: 90 TABLET | Refills: 1 | Status: SHIPPED | OUTPATIENT
Start: 2019-10-29 | End: 2020-05-13

## 2019-10-29 ASSESSMENT — PAIN SCALES - GENERAL: PAINLEVEL: NO PAIN (0)

## 2019-10-29 NOTE — PROGRESS NOTES
"  Psychiatry Clinic Progress Note                                                                   Rylee Owens is a 29 year old male who returns to the clinic for follow-up care  Therapist: None  PCP: No primary care provider on file.  Other Providers: None    Pertinent Background:  See previous notes.  Psych critical item history includes [no critical items].     Interim History                                                                                                        4, 4     The patient is a good historian, reports good treatment adherence and was last seen 7/30/19Since the last visit, Rylee reports \"i'm in good mood.\"  He is in a new relationship.  Mood and sleep have improved.  Anxiety is \"ok\" and is not a concern.  He has not needed to use Ativan in past 3 months.  Using Hydroxyzine instead and finds effective for as needed anxiety relief.   He also has incorporated a more vegetarian diet and has lost 16lbs in past 3 months.  Enjoyable trip back home to [place].      7/30/19: Rylee report he is \"good.\"  Reports improvement since last appointment.  He no longer is overeating and mood has improved.  He has been taking OTC benadryl twice per week for sleep.  He finds the benadryl puts him to sleep faster and he feels less sedated the following morning.  Also continues to take hydroxyzine approximately once per week.  He does not take medications simultaneously.  Rylee is currently on summer break from school.  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  Rylee is also trying to meet new people and develop new hobbies.  He believes school schedule will be very helpful.  He is leaving for 3 week trip to [place] next week.      5/28/19: Rylee reports he ended engagement with [employer] which has negatively impacted mood.  He has been experiencing increased insomnia and finds himself overeating.  Semester ended last week which has " "decreased anxiety and stress.  Reports taking Ativan and hydroxyzine for insomnia and found ineffective. He did try Benadryl (unknown dose) as he has seasonal allergies and was helpful with sleep.    3/12/19: Rylee reports that past 3 months were \"good. I feel better.\" He experiences less worry and feels less tense in his body.   He used Lorazepam once or twice in past 3 months.  Using hydroxyzine instead when feeling tense or excessive worry.  Rylee has been using video game as a distraction from school-related stress.  He is playing for an hour when he gets home when anxiety is worse.  He reports staying up late most nights (3am).  He does have class at 10am (wakes at 8am) three days per week.  He is able to function but on the days he does not have class, he will occasionally sleep until the afternoon.  He plans to go to [place] next week during spring break.  No concerns with side effects.      12/11/18: Rylee reports everything is \"ok.\"  He reports he had a paper rejected for inclusion in a journal.  The rejection worsened depression but he believes it will pass. He appears to be coping with rejection appropriately.  He is looking forward to end of semester as he plans to travel ([place] and [facility]).  Rylee reports his anxiety is better since last appointment.  No concerns with sleep.  Occasionally is taking hydroxyzine for sleep.  Continues to use Ativan less than once per week.      11/13/18: Rylee reports that anxiety has decreased in intensity.  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  He does set aside time to relax and finds that he can relax when necessary.  Hydroxyzine is helping with sleep and he feels rested the next day.  He reports he used Ativan 0.5mg approximately 3 times in the past month.  Does not want to adjust medications today.      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  Attended [employer] for graduate school with focus on Mathamatics.  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      No past surgical history on file.     Medical Review of Systems                                                                                                    2,10   The remainder of the review of systems is noncontributory  Allergy                                Patient has no known allergies.  Current Medications                                                                                                       Current Outpatient Medications   Medication Sig Dispense Refill     citalopram (CELEXA) 40 MG tablet Take 1 tablet (40 mg) by mouth every morning 90 tablet 0     hydrOXYzine (VISTARIL) 25 MG capsule Take 1-2 capsules (25-50mg) as needed for sleep 60 capsule 1     LORazepam (ATIVAN) 0.5 MG tablet Take 1-2 tablets (0.5-1 mg) by mouth as needed for anxiety 30 tablet 0     Vitals                                                                                                                       3, 3   /74   Pulse 67   Wt 77.7  kg (171 lb 3.2 oz)    Mental Status Exam                                                                                    9, 14 cog gs     Alertness: alert  and oriented  Appearance: casually groomed  Behavior/Demeanor: cooperative, pleasant and calm, with fair  eye contact   Speech: regular rate and rhythm  Language: no problems  Psychomotor: normal or unremarkable  Mood: description consistent with euthymia  Affect: appropriate; was congruent to mood; was congruent to content  Thought Process/Associations: unremarkable  Thought Content:  Reports none;  Denies suicidal ideation and violent ideation  Perception:  Reports none;  Denies auditory hallucinations and visual hallucinations  Insight: good  Judgment: good  Cognition: (6) does  appear grossly intact; formal cognitive testing was not done  Gait/Station and/or Muscle Strength/Tone: unremarkable    Labs and Data                                                                                                                 Rating Scales:    PHQ9    PHQ9 Today:  0  PHQ-9 SCORE 3/12/2019 6/13/2019 7/30/2019   PHQ-9 Total Score 2 8 4         Diagnosis and Assessment                                                                             m2, h3     Today the following issues were addressed:    1) Generalized Anxiety Disorder     [place] Pres[place] [] was not checked today:  will be checked next visit.     PSYCHOTROPIC DRUG INTERACTIONS: Micromedex.  Celexa-Hydroxyzine: Concurrent use of CITALOPRAM and HYDROXYZINE may result in increased risk of QT interval prolongation. .    Plan                                                                                                                    m2, h3      1) Medication Management  Continue Citalopram 40mg daily  Continue Ativan 0.5-1mg as needed.                           Has not used in past 3 months  Continue Hydroxyzine 25-50mg PRN at bedtime.       2) Therapy  recommended        RTC: 6  months    CRISIS NUMBERS:   Provided routinely in AVS.    Treatment Risk Statement:  The patient understands the risks, benefits, adverse effects and alternatives. Agrees to treatment with the capacity to do so. No medical contraindications to treatment. Agrees to call clinic for any problems. The patient understands to call 911 or go to the nearest ED if life threatening or urgent symptoms occur.        PROVIDER:  JOSE Lundberg CNP

## 2019-11-07 ASSESSMENT — PATIENT HEALTH QUESTIONNAIRE - PHQ9: SUM OF ALL RESPONSES TO PHQ QUESTIONS 1-9: 0

## 2020-03-11 ENCOUNTER — HEALTH MAINTENANCE LETTER (OUTPATIENT)
Age: 30
End: 2020-03-11

## 2020-05-13 ENCOUNTER — MYC REFILL (OUTPATIENT)
Dept: PSYCHIATRY | Facility: CLINIC | Age: 30
End: 2020-05-13

## 2020-05-13 DIAGNOSIS — F41.9 ANXIETY: ICD-10-CM

## 2020-05-14 RX ORDER — CITALOPRAM HYDROBROMIDE 40 MG/1
40 TABLET ORAL EVERY MORNING
Qty: 90 TABLET | Refills: 0 | Status: SHIPPED | OUTPATIENT
Start: 2020-05-14 | End: 2020-09-16

## 2020-05-14 NOTE — TELEPHONE ENCOUNTER
Last seen: 10/29/19  RTC: 6 months  Cancel: 3/24/20  No-show: none  Next appt: none     Medication requested: citalopram (CELEXA) 40 MG tablet  Directions: Take 1 tablet (40 mg) by mouth every morning  Qty: 90  Last refilled: 3/11/20 per outside med rec     Routed to provider due to cancellation.    Reached out to pt and asked that he schedule [facility].

## 2020-09-14 DIAGNOSIS — F41.9 ANXIETY: ICD-10-CM

## 2020-09-16 NOTE — TELEPHONE ENCOUNTER
Medication requested: citalopram 40 mg tablet   Last refilled: 6/22/20  Qty: 90      Last seen: 10/29/19  RTC: 6 months  Cancel: 1  No-show: 0  Next appt: 0    Refill decision:   Refill pended and routed to the provider for review/determination due to   Cancel x 1  Pt outside of RTC timeframe.  Scheduling has been notified to contact the pt for appointment.

## 2020-09-17 RX ORDER — CITALOPRAM HYDROBROMIDE 40 MG/1
40 TABLET ORAL EVERY MORNING
Qty: 30 TABLET | Refills: 0 | Status: SHIPPED | OUTPATIENT
Start: 2020-09-17 | End: 2020-09-29

## 2020-09-17 NOTE — TELEPHONE ENCOUNTER
Trevon Molina, APRN Lilliana Harvey, RN    Caller: Unspecified (3 days ago,  7:25 AM)               Tien Tijerina,     I'll give one month supply but he needs to be scheduled        Message sent to scheduling to reach out to pt and schedule f/up.

## 2020-09-29 ENCOUNTER — VIRTUAL VISIT (OUTPATIENT)
Dept: PSYCHIATRY | Facility: CLINIC | Age: 30
End: 2020-09-29
Attending: NURSE PRACTITIONER
Payer: COMMERCIAL

## 2020-09-29 DIAGNOSIS — F41.9 ANXIETY: ICD-10-CM

## 2020-09-29 RX ORDER — CITALOPRAM HYDROBROMIDE 40 MG/1
40 TABLET ORAL EVERY MORNING
Qty: 90 TABLET | Refills: 1 | Status: SHIPPED | OUTPATIENT
Start: 2020-09-29 | End: 2020-12-14

## 2020-09-29 RX ORDER — HYDROXYZINE PAMOATE 25 MG/1
CAPSULE ORAL
Qty: 60 CAPSULE | Refills: 1 | Status: SHIPPED | OUTPATIENT
Start: 2020-09-29

## 2020-09-29 ASSESSMENT — PAIN SCALES - GENERAL: PAINLEVEL: NO PAIN (0)

## 2020-09-29 NOTE — PROGRESS NOTES
"Video- Visit Details  Type of service:  video visit for medication management  Time of service:    Date:  09/29/2020    Video Start Time:  2:39 PM        Video End Time:  2:51pm    Reason for video visit:  Patient unable to travel due to Covid-19  Originating Site (patient location):  [facility]   Location- Patient's home  Distant Site (provider location):  Remote location  Mode of Communication:  Video Conference via AmWell  Consent:  Patient has given verbal consent for video visit?: Yes     VIDEO VISIT  Rylee Owens is a 30 year old patient who is being evaluated via a billable video visit.      The patient has been notified of following:   \"This video visit will be conducted via a call between you and your physician/provider. We have found that certain health care needs can be provided without the need for an in-person physical exam. This service lets us provide the care you need with a video conversation. If a prescription is necessary we can send it directly to your pharmacy. If lab work is needed we can place an order for that and you can then stop by our lab to have the test done at a later time. Insurers are generally covering virtual visits as they would in-office visits so billing should not be different than normal.  If for some reason you do get billed incorrectly, you should contact the billing office to correct it and that number is in the AVS .    Video Conference to be completed via:  Amwell    Patient has given verbal consent for video visit?:  Yes    Patient would prefer that any video invitations be sent by: Send to e-mail at: [email or web address]      How would patient like to obtain AVS?:  Tab [facility]    AVS SmartPhrase [PsychAVS] has been placed in 'Patient Instructions':  Yes      Psychiatry Clinic Progress Note                                                                   Rylee Owens is a 30 year old male who returns to the clinic for follow-up care  Therapist: None  PCP: No primary care " "provider on file.  Other Providers: None    Pertinent Background:  See previous notes.  Psych critical item history includes [no critical items].     Interim History                                                                                                        4, 4     The patient is a good historian, reports good treatment adherence and was last seen 10/29/19 Since the last visit, Rylee reports he is \"well.\" Anxiety described as \"manageable.\"  He also ended romantic relationship and  will be moving to [place] next January.  He also struggled with sleep for during August which he attributes to stress.  Rylee does not want to adjust medications     10/29/20: Rylee reports \"i'm in good mood.\"  He is in a new relationship.  Mood and sleep have improved.  Anxiety is \"ok\" and is not a concern.  He has not needed to use Ativan in past 3 months.  Using Hydroxyzine instead and finds effective for as needed anxiety relief.   He also has incorporated a more vegetarian diet and has lost 16lbs in past 3 months.  Enjoyable trip back home to [place].      7/30/19: Rylee report he is \"good.\"  Reports improvement since last appointment.  He no longer is overeating and mood has improved.  He has been taking OTC benadryl twice per week for sleep.  He finds the benadryl puts him to sleep faster and he feels less sedated the following morning.  Also continues to take hydroxyzine approximately once per week.  He does not take medications simultaneously.  Rylee is currently on summer break from school.  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  Rylee is also trying to meet new people and develop new hobbies.  He believes school schedule will be very helpful.  He is leaving for 3 week trip to [place] next week.      5/28/19: Rylee reports he ended engagement with fiance which has negatively impacted mood.  He has been experiencing increased insomnia and " "finds himself overeating.  Semester ended last week which has decreased anxiety and stress.  Reports taking Ativan and hydroxyzine for insomnia and found ineffective. He did try Benadryl (unknown dose) as he has seasonal allergies and was helpful with sleep.    3/12/19: Rylee reports that past 3 months were \"good. I feel better.\" He experiences less worry and feels less tense in his body.   He used Lorazepam once or twice in past 3 months.  Using hydroxyzine instead when feeling tense or excessive worry.  Rylee has been using video game as a distraction from school-related stress.  He is playing for an hour when he gets home when anxiety is worse.  He reports staying up late most nights (3am).  He does have class at 10am (wakes at 8am) three days per week.  He is able to function but on the days he does not have class, he will occasionally sleep until the afternoon.  He plans to go to [place] next week during spring break.  No concerns with side effects.      12/11/18: Rylee reports everything is \"ok.\"  He reports he had a paper rejected for inclusion in a journal.  The rejection worsened depression but he believes it will pass. He appears to be coping with rejection appropriately.  He is looking forward to end of semester as he plans to travel ([place] and [facility]).  Rylee reports his anxiety is better since last appointment.  No concerns with sleep.  Occasionally is taking hydroxyzine for sleep.  Continues to use Ativan less than once per week.      11/13/18: Rylee reports that anxiety has decreased in intensity.  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  He does set aside time to relax and finds that he can relax when necessary.  Hydroxyzine is helping with sleep and he feels rested the next day.  He reports he used Ativan 0.5mg approximately 3 times in the past month.  Does not want to adjust " medications today.      Recent Symptoms:   Depression:  not endorsed today  Elevated:  none  Psychosis:  none  Anxiety:  occasional worry  Panic Attack:  none  Trauma Related:  none     Recent Substance Use:  No changes reported          Social/ Family History                                  [per patient report]                                 1ea,1ea   FINANCIAL SUPPORT- working     Post doc professor at [employer]  CHILDREN- None       LIVING SITUATION- Lives alone in apartment in [place]      LEGAL- None  EARLY HISTORY/ EDUCATION- Grew up in [place].  Attended [employer] for graduate school with focus on Mathamatics.  Currently post doc at [employer]  SOCIAL/ SPIRITUAL SUPPORT- limited support system       CULTURAL INFLUENCES/ IMPACT- none       TRAUMA HISTORY (self-report)- None  FEELS SAFE AT HOME- Yes  FAMILY HISTORY-  Mother possibly has anxiety issues    Medical / Surgical History                                                                                                                There is no problem list on file for this patient.      No past surgical history on file.     Medical Review of Systems                                                                                                    2,10   The remainder of the review of systems is noncontributory  Allergy                                Patient has no known allergies.  Current Medications                                                                                                       Current Outpatient Medications   Medication Sig Dispense Refill     citalopram (CELEXA) 40 MG tablet Take 1 tablet (40 mg) by mouth every morning For more refills,schedule an appointment at [phone] 30 tablet 0     hydrOXYzine (VISTARIL) 25 MG capsule Take 1-2 capsules (25-50mg) as needed for sleep 60 capsule 1     LORazepam (ATIVAN) 0.5 MG tablet Take 1-2 tablets (0.5-1 mg) by mouth as needed for anxiety 30 tablet 0     Vitals                                                                                                                        3, 3   There were no vitals taken for this visit.   Mental Status Exam                                                                                    9, 14 cog gs     Alertness: alert  and oriented  Appearance: casually groomed  Behavior/Demeanor: cooperative, pleasant and calm, with fair  eye contact   Speech: regular rate and rhythm  Language: no problems  Psychomotor: normal or unremarkable  Mood: description consistent with euthymia  Affect: appropriate; was congruent to mood; was congruent to content  Thought Process/Associations: unremarkable  Thought Content:  Reports none;  Denies suicidal ideation and violent ideation  Perception:  Reports none;  Denies auditory hallucinations and visual hallucinations  Insight: good  Judgment: good  Cognition: (6) does  appear grossly intact; formal cognitive testing was not done  Gait/Station and/or Muscle Strength/Tone: unable to assess    Labs and Data                                                                                                                 Rating Scales:    PHQ9    PHQ9 Today:  Not completed  PHQ-9 SCORE 6/13/2019 7/30/2019 10/29/2019   PHQ-9 Total Score 8 4 0         Diagnosis and Assessment                                                                             m2, h3     Today the following issues were addressed:    1) Generalized Anxiety Disorder     [place] [] was checked today:  No controlled medications filled in past year      PSYCHOTROPIC DRUG INTERACTIONS: Micromedex.  Celexa-Hydroxyzine: Concurrent use of CITALOPRAM and HYDROXYZINE may result in increased risk of QT interval prolongation. .    Plan                                                                                                                    m2, h3      1) Medication Management  Continue Citalopram 40mg daily  Continue Ativan 0.5-1mg as needed.   Uses extremely infrequently  Continue Hydroxyzine 25-50mg PRN at bedtime.       2) Therapy  recommended        RTC: 6 months    CRISIS NUMBERS:   Provided routinely in AVS.    Treatment Risk Statement:  The patient understands the risks, benefits, adverse effects and alternatives. Agrees to treatment with the capacity to do so. No medical contraindications to treatment. Agrees to call clinic for any problems. The patient understands to call 911 or go to the nearest ED if life threatening or urgent symptoms occur.        PROVIDER:  JOSE Lundberg CNP

## 2020-09-29 NOTE — PATIENT INSTRUCTIONS
Thank you for coming to the PSYCHIATRY CLINIC.    Lab Testing:  If you had lab testing today and your results are reassuring or normal they will be mailed to you or sent through [employer] within 7 days. If the lab tests need quick action we will call you with the results. The phone number we will call with results is # [phone] (home) . If this is not the best number please call our clinic and change the number.    Medication Refills:  If you need any refills please call your pharmacy and they will contact us. Our fax number for refills is [phone]. Please allow three business for refill processing. If you need to  your refill at a new pharmacy, please contact the new pharmacy directly. The new pharmacy will help you get your medications transferred.     Scheduling:  If you have any concerns about today's visit or wish to schedule another appointment please call our office during normal business hours [phone] (8-5:00 M-F)    Contact Us:  Please call [phone] during business hours (8-5:00 M-F).  If after clinic hours, or on the weekend, please call  [phone].    Financial Assistance [phone]  [facility] Billing [phone]  Central Billing Office, [facility]: [phone]  [facility] Billing [phone]  Medical Records [phone]      MENTAL HEALTH CRISIS NUMBERS:  For a medical emergency please call  911 or go to the nearest ER.     [facility]:   [facility] -[phone]   Crisis Residence [facility] -[phone]   Walk-In Counseling [facility] -[phone]   COPE 24/7 [place] Mobile Team -[phone] (adults)/[phone] (child)  CHILD: [facility] needs assessment team - [phone]      [facility]:   [facility] - [phone]   Walk-in counseling [facility] - [phone]   Walk-in counseling [facility] - [phone]   Crisis Residence [facility] - [phone]  Urgent  [facility]-[phone] mobile unit/ 24/7 crisis line    National Crisis Numbers:   National Suicide Prevention Lifeline: 9-206-198-TALK (139-204-1528)  Poison Control Center - 5-099-764-7868  [employer]/resources for a list of additional resources (SOS)  Trans Lifeline a hotline for transgender people 7-694-443-4981  The Sam Project a hotline for LGBT youth 1-523.947.7483  Crisis Text Line: For any crisis 24/7   To: 477761  see www.crisistextline.org  - IF MAKING A CALL FEELS TOO HARD, send a text!         Again thank you for choosing PSYCHIATRY CLINIC and please let us know how we can best partner with you to improve you and your family's health.    You may be receiving a survey regarding this appointment. We would love to have your feedback, both positive and negative. The survey is done by an external company, so your answers are anonymous.

## 2020-12-14 ENCOUNTER — MYC MEDICAL ADVICE (OUTPATIENT)
Dept: PSYCHIATRY | Facility: CLINIC | Age: 30
End: 2020-12-14

## 2020-12-14 DIAGNOSIS — F41.9 ANXIETY: ICD-10-CM

## 2020-12-14 RX ORDER — CITALOPRAM HYDROBROMIDE 40 MG/1
40 TABLET ORAL EVERY MORNING
Qty: 90 TABLET | Refills: 0 | Status: SHIPPED | OUTPATIENT
Start: 2020-12-14

## 2020-12-14 NOTE — TELEPHONE ENCOUNTER
Last seen: 9/29  RTC: 6 months  Cancel: none  No-show: none  Next appt: [place] of [place]     Incoming refill from patient via [employer]     citalopram (CELEXA) 40 MG tablet 90 tablet 1 9/29/2020  No   Sig - Route: Take 1 tablet (40 mg) by mouth every morning - Oral       Last refilled: 9/17     Medication pended and routed to provider for approval.

## 2021-01-03 ENCOUNTER — HEALTH MAINTENANCE LETTER (OUTPATIENT)
Age: 31
End: 2021-01-03

## 2021-04-25 ENCOUNTER — HEALTH MAINTENANCE LETTER (OUTPATIENT)
Age: 31
End: 2021-04-25

## 2021-10-10 ENCOUNTER — HEALTH MAINTENANCE LETTER (OUTPATIENT)
Age: 31
End: 2021-10-10

## 2022-05-21 ENCOUNTER — HEALTH MAINTENANCE LETTER (OUTPATIENT)
Age: 32
End: 2022-05-21

## 2022-09-18 ENCOUNTER — HEALTH MAINTENANCE LETTER (OUTPATIENT)
Age: 32
End: 2022-09-18

## 2023-06-04 ENCOUNTER — HEALTH MAINTENANCE LETTER (OUTPATIENT)
Age: 33
End: 2023-06-04